# Patient Record
Sex: MALE | Race: WHITE | NOT HISPANIC OR LATINO | Employment: OTHER | URBAN - METROPOLITAN AREA
[De-identification: names, ages, dates, MRNs, and addresses within clinical notes are randomized per-mention and may not be internally consistent; named-entity substitution may affect disease eponyms.]

---

## 2020-02-04 PROBLEM — M51.26 RUPTURED LUMBAR INTERVERTEBRAL DISC: Status: ACTIVE | Noted: 2020-02-04

## 2020-03-03 ENCOUNTER — TELEPHONE (OUTPATIENT)
Dept: ORTHOPEDICS | Facility: CLINIC | Age: 35
End: 2020-03-03

## 2020-03-03 NOTE — TELEPHONE ENCOUNTER
Callback message left. Discuss with patient the nature of his issue, if it is a neck/spine issue, and if he would approve being seen by Dr Flores for a sooner appointment.

## 2020-03-03 NOTE — TELEPHONE ENCOUNTER
----- Message from Suzy Parra sent at 3/3/2020  9:22 AM CST -----  Contact: pt   Patient Requesting Sooner Appointment.     Reason for sooner appt.: pt is calling to speak with the nurse pt is a new pt coming in for back and leg pain from herniated disk  When is the first available appointment? N/A  Communication Preference: can you please call pt at [phone]  Additional Information: none    MAYI

## 2020-03-06 ENCOUNTER — OFFICE VISIT (OUTPATIENT)
Dept: ORTHOPEDICS | Facility: CLINIC | Age: 35
End: 2020-03-06
Payer: COMMERCIAL

## 2020-03-06 VITALS
RESPIRATION RATE: 18 BRPM | HEIGHT: 72 IN | HEART RATE: 79 BPM | OXYGEN SATURATION: 98 % | SYSTOLIC BLOOD PRESSURE: 127 MMHG | WEIGHT: 315 LBS | BODY MASS INDEX: 42.66 KG/M2 | DIASTOLIC BLOOD PRESSURE: 91 MMHG

## 2020-03-06 DIAGNOSIS — M51.16 LUMBAR DISC HERNIATION WITH RADICULOPATHY: Primary | ICD-10-CM

## 2020-03-06 PROCEDURE — 99999 PR PBB SHADOW E&M-EST. PATIENT-LVL IV: ICD-10-PCS | Mod: PBBFAC,,, | Performed by: ORTHOPAEDIC SURGERY

## 2020-03-06 PROCEDURE — 99203 OFFICE O/P NEW LOW 30 MIN: CPT | Mod: S$GLB,,, | Performed by: ORTHOPAEDIC SURGERY

## 2020-03-06 PROCEDURE — 99203 PR OFFICE/OUTPT VISIT, NEW, LEVL III, 30-44 MIN: ICD-10-PCS | Mod: S$GLB,,, | Performed by: ORTHOPAEDIC SURGERY

## 2020-03-06 PROCEDURE — 99999 PR PBB SHADOW E&M-EST. PATIENT-LVL IV: CPT | Mod: PBBFAC,,, | Performed by: ORTHOPAEDIC SURGERY

## 2020-03-06 RX ORDER — DILTIAZEM HYDROCHLORIDE 120 MG/1
180 CAPSULE, COATED, EXTENDED RELEASE ORAL DAILY
COMMUNITY
End: 2022-05-12

## 2020-03-06 RX ORDER — DICLOFENAC SODIUM 75 MG/1
75 TABLET, DELAYED RELEASE ORAL 2 TIMES DAILY
Qty: 60 TABLET | Refills: 1 | Status: SHIPPED | OUTPATIENT
Start: 2020-03-06 | End: 2022-05-12

## 2020-03-06 NOTE — ASSESSMENT & PLAN NOTE
1.  Findings, diagnosis, treatment options/risks/benefits were reviewed with the patient and his wife who accompanied him today  2.  Voltaren 75 mg p.o. B.i.d. (discontinue ibuprofen)  3.  Cane support right hand p.r.n.  4.  Maintain judicious activity modifications/limitation  5.  Initiate supervised physical therapy program  6.  Referred to pain management at [facility] for evaluation for a lumbar epidural steroid injection  7.  Follow up here on a p.r.n. basis

## 2020-03-06 NOTE — PROGRESS NOTES
Subjective:    Patient ID:  Tai Martínez is a 34 y.o. y.o. male who presents for initial visit for Back Pain      34-year-old male reports a 6-7 month history of left-sided low back pain radiating to the left lower leg and foot with associated numbness. He states that his symptoms began when he fell while getting out of his truck landing on his buttocks.  He experiences constant pain that is aggravated with prolonged walking, standing and sitting as well bending, twisting, turning and coughing.  He sought evaluation with an orthopedic surgeon at [facility] in January, an MRI of the lumbar spine was obtained showing a left-sided disc herniation at L5-S1.  Patient states he was scheduled for surgery but this was canceled due to insurance reasons.  He was seen at [facility] ED on 03/02/2020 for worsening pain and onset of the saddle anesthesia.  A neurosurgical evaluation at that visit ruled out cauda equina syndrome.  He notes that the saddle anesthesia has subsequently resolved.  He denies motor weakness, bowel/bladder dysfunction and constitutional symptoms. He takes Norco and over-the-counter ibuprofen for his pain. He has had no other treatment. He presents here today on self-referral for orthopedic evaluation.            Past Medical History:   Diagnosis Date    Atrial fibrillation         Past Surgical History:   Procedure Laterality Date    TONSILLECTOMY         Review of patient's allergies indicates:  No Known Allergies       Current Outpatient Medications:     diltiaZEM (CARDIZEM CD) 120 MG Cp24, Take 180 mg by mouth once daily., Disp: , Rfl:     HYDROcodone-acetaminophen (NORCO) 7.5-325 mg per tablet, Take 1 tablet by mouth every 4 (four) hours as needed for Pain., Disp: 10 tablet, Rfl: 0    Social History     Socioeconomic History    Marital status:      Spouse name: Not on file    Number of children: Not on file    Years of education: Not on file    Highest education level: Not on  file   Occupational History    Not on file   Social Needs    Financial resource strain: Not on file    Food insecurity:     Worry: Not on file     Inability: Not on file    Transportation needs:     Medical: Not on file     Non-medical: Not on file   Tobacco Use    Smoking status: Current Every Day Smoker     Packs/day: 1.00     Years: 10.00     Pack years: 10.00    Smokeless tobacco: Never Used   Substance and Sexual Activity    Alcohol use: Yes     Comment: OCCASIONAL    Drug use: No    Sexual activity: Not on file   Lifestyle    Physical activity:     Days per week: Not on file     Minutes per session: Not on file    Stress: Not on file   Relationships    Social connections:     Talks on phone: Not on file     Gets together: Not on file     Attends [employer] service: Not on file     Active member of club or organization: Not on file     Attends meetings of clubs or organizations: Not on file     Relationship status: Not on file   Other Topics Concern    Not on file   Social History Narrative    Not on file        History reviewed. No pertinent family history.     Review of Systems   Constitutional: Negative for chills and fever.   HENT: Negative for hearing loss.    Eyes: Negative for blurred vision.   Respiratory: Negative for shortness of breath.    Cardiovascular: Negative for chest pain.   Gastrointestinal: Negative for nausea and vomiting.   Genitourinary: Negative for frequency and urgency.   Neurological: Positive for tingling. Negative for weakness.   Psychiatric/Behavioral: Negative for depression.        Objective:     BP (!) 127/91 (BP Location: Right arm, Patient Position: Sitting, BP Method: Large (Manual))   Pulse 79   Resp 18   Ht 6' (1.829 m)   Wt (!) 154.4 kg (340 lb 6.2 oz)   SpO2 98%   BMI 46.17 kg/m²     Ortho Exam     Obese 34-year-old male walks with a mildly antalgic gait on the left; can heel and toe walk    Bilateral lower extremity:  Motor strength 5/5; sensation  intact light touch except S1 distribution on the left; deep tendon reflexes 2+ and symmetric at the knees and ankles; plantar responses downgoing; negative clonus; positive seated straight leg raise on the left    Lumbosacral spine:  Left paralumbar tenderness L4 to S1 region; decreased range of motion with finger tips to the knees, neutral extension; pain on rotation and lateral bending      Imaging:     Report of MRI dated 01/28/2020 from [facility] is reviewed and describes a left paracentral disc extrusion with extruded fragment extending slightly superiorly resulting in moderate to severe left lateral recess stenosis.      Assessment & Plan:     Lumbar disc herniation with radiculopathy  1.  Findings, diagnosis, treatment options/risks/benefits were reviewed with the patient and his wife who accompanied him today  2.  Voltaren 75 mg p.o. B.i.d. (discontinue ibuprofen)  3.  Cane support right hand p.r.n.  4.  Maintain judicious activity modifications/limitation  5.  Initiate supervised physical therapy program  6.  Referred to pain management at [facility] for evaluation for a lumbar epidural steroid injection  7.  Follow up here on a p.r.n. basis

## 2020-03-10 PROBLEM — M53.86 SCIATICA ASSOCIATED WITH DISORDER OF LUMBAR SPINE: Status: ACTIVE | Noted: 2020-03-10
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
## 2020-03-10 PROBLEM — S39.012A STRAIN OF LUMBAR PARASPINAL MUSCLE, INITIAL ENCOUNTER: Status: ACTIVE | Noted: 2020-03-10

## 2020-03-10 PROBLEM — M54.16 LUMBAR RADICULOPATHY, ACUTE: Status: ACTIVE | Noted: 2020-03-10

## 2021-05-06 ENCOUNTER — PATIENT MESSAGE (OUTPATIENT)
Dept: RESEARCH | Facility: HOSPITAL | Age: 36
End: 2021-05-06

## 2022-05-05 PROBLEM — M75.121 COMPLETE ROTATOR CUFF TEAR OR RUPTURE OF RIGHT SHOULDER, NOT SPECIFIED AS TRAUMATIC: Status: ACTIVE | Noted: 2022-05-05
